# Patient Record
Sex: MALE | Race: WHITE
[De-identification: names, ages, dates, MRNs, and addresses within clinical notes are randomized per-mention and may not be internally consistent; named-entity substitution may affect disease eponyms.]

---

## 2021-03-31 ENCOUNTER — HOSPITAL ENCOUNTER (EMERGENCY)
Dept: HOSPITAL 54 - ER | Age: 76
Discharge: NURSING, REHAB OR LONG-TERM CARE | End: 2021-03-31
Payer: MEDICAID

## 2021-03-31 VITALS — HEIGHT: 74 IN | BODY MASS INDEX: 24.13 KG/M2 | WEIGHT: 188 LBS

## 2021-03-31 VITALS — SYSTOLIC BLOOD PRESSURE: 108 MMHG | DIASTOLIC BLOOD PRESSURE: 54 MMHG

## 2021-03-31 DIAGNOSIS — G20: ICD-10-CM

## 2021-03-31 DIAGNOSIS — K21.9: ICD-10-CM

## 2021-03-31 DIAGNOSIS — T83.031A: Primary | ICD-10-CM

## 2021-03-31 DIAGNOSIS — I10: ICD-10-CM

## 2021-03-31 NOTE — NUR
REPORT GIVEN TO LOIDA NELSON AT [facility]. REPORT GIVEN TO EMTS. 
PATIENT A/OX2, BREATHING EVEN AND UNLABORED, NO SOB NOTED. NEEDS ATTENDED.  
SUPRAPUBIC INTACT AND DRAINING WELL. NOTED WITH CLOUDY URINE. MD AWARE. Patient 
discharged to snf in stable condition. Written and verbal after care 
instructions given. Patient verbalizes understanding of instruction.
